# Patient Record
Sex: MALE | Race: WHITE
[De-identification: names, ages, dates, MRNs, and addresses within clinical notes are randomized per-mention and may not be internally consistent; named-entity substitution may affect disease eponyms.]

---

## 2017-07-18 ENCOUNTER — HOSPITAL ENCOUNTER (EMERGENCY)
Dept: HOSPITAL 21 - SED | Age: 52
Discharge: HOME | End: 2017-07-18
Payer: COMMERCIAL

## 2017-07-18 VITALS
SYSTOLIC BLOOD PRESSURE: 145 MMHG | DIASTOLIC BLOOD PRESSURE: 91 MMHG | HEART RATE: 99 BPM | OXYGEN SATURATION: 100 % | RESPIRATION RATE: 14 BRPM

## 2017-07-18 VITALS
SYSTOLIC BLOOD PRESSURE: 173 MMHG | DIASTOLIC BLOOD PRESSURE: 72 MMHG | OXYGEN SATURATION: 100 % | RESPIRATION RATE: 14 BRPM | HEART RATE: 96 BPM

## 2017-07-18 VITALS — WEIGHT: 230.49 LBS | BODY MASS INDEX: 34.93 KG/M2 | HEIGHT: 68 IN

## 2017-07-18 VITALS — HEART RATE: 84 BPM | RESPIRATION RATE: 21 BRPM | OXYGEN SATURATION: 94 %

## 2017-07-18 VITALS — RESPIRATION RATE: 22 BRPM | OXYGEN SATURATION: 92 % | HEART RATE: 91 BPM

## 2017-07-18 DIAGNOSIS — Z88.5: ICD-10-CM

## 2017-07-18 DIAGNOSIS — E78.5: ICD-10-CM

## 2017-07-18 DIAGNOSIS — G89.29: ICD-10-CM

## 2017-07-18 DIAGNOSIS — Z79.84: ICD-10-CM

## 2017-07-18 DIAGNOSIS — R53.83: ICD-10-CM

## 2017-07-18 DIAGNOSIS — M54.5: ICD-10-CM

## 2017-07-18 DIAGNOSIS — E11.9: ICD-10-CM

## 2017-07-18 DIAGNOSIS — R45.1: Primary | ICD-10-CM

## 2017-07-18 DIAGNOSIS — I10: ICD-10-CM

## 2017-07-18 LAB
BASOPHILS % (AUTO): 0.8 % (ref 0–3)
EOSINOPHILS % (AUTO): 1.8 % (ref 0–5)
MAGNESIUM: 2 MG/DL (ref 1.6–2.6)
MCH RBC QN AUTO: 30.5 PG (ref 27–35)
MEAN CORPUSCULAR VOLUME: 86.7 FL (ref 81–100)
MONOCYTES % (AUTO): 9.9 % (ref 4–12)
NEUTROPHILS NFR BLD AUTO: 65.8 % (ref 40–74)
PLATELET COUNT: 317 BIL/L (ref 150–400)
TROPONIN T SERPL-MCNC: 0.01 UG/L (ref 0–0.01)

## 2017-07-18 NOTE — ED.REPORT
HPI-General Illness


Date of Service


Jul 18, 2017


 ED Provider: Eris Lao MD


Patient is a 52 year old male with a hx of HTN, hyperlipidemia, and DM who 

presents to the ED via EMS s/p being found sleeping in his car at a gas station 

and per medics, acting confused. Upon interview, he has no medical complaints. 

He states "I am not confused. I am of a sound mind." He also reports that he 

does not want the officer that was on scene to enter his room because "he took 

something from my vehicle illegally without probable cause." He reports feeling 

fatigued and agitated in response to the situation. When asked if he would like 

medical care he states "I am not refusing care." He denies chest pain, SOB, 

headache, vomiting, fevers, dysuria, or any other symptoms. 


He takes metformin, Lipitor, Buspirone, Dicyclomine, Glipizide, Lisinopril, 

Metoprolol, omeprazole, Effexor and Lipoxide daily. He has not been on 

Methadone for 6 mo. He was on Methadone for his back pain.


[facility] 


He denies drug use or homelessness.  He denies drug testing.


Nursing Notes


Stated Complaint:  CONFUSION


Chief Complaint:  General Complaint


Nursing Notes Reviewed:  Yes


Allergies:  


Coded Allergies:  


     tramadol (Verified  Allergy, Intermediate, ANGRY, 4/14/16)


Scheduled


Atorvastatin (Lipitor) 80 Mg Tablet 80 MG PO DAILY 


Buspirone (Buspirone) 15 Mg Tablet 15 MG PO TID 


Cholecalciferol (Vitamin D3) (Vitamin D) 1,000 Unit Tablet 2,000 UNIT PO DAILY 


Gabapentin (Gabapentin) 300 Mg Capsule 300 MG PO TID 


Glipizide (Glipizide) 5 Mg Tablet 5 MG PO BID 


Lisinopril (Lisinopril) 20 Mg Tablet 20 MG PO DAILY 


Metformin (Metformin) 500 Mg Tablet 2,000 MG PO QPM 


Methadone (Methadone) 5 Mg Tablet 2.5 MG PO BID 


Metoprolol Tartrate (Metoprolol Tartrate) 25 Mg Tablet 25 MG PO BID 


Omeprazole (Omeprazole) 40 Mg Capsule.dr 40 MG PO BID 


Venlafaxine ER (Venlafaxine ER) 150 Mg Tab.er.24 300 MG PO DAILY 





Scheduled PRN


Dicyclomine (Dicyclomine) 20 Mg Tablet 20 MG PO QID PRN PRN For GI Cramps 





General


Time Seen by MD:  08:27





Chief Complaint


Other (Confusion )


Hx Obtained From:  Patient


Arrived By:  Ambulance


Onset Occurred:  Just prior to arrival


Severity: Current:  No pain currently


Severity: Maximum:  No pain





Past Medical History


Past Medical History





Carpal Tunnel 


HTN


Hyperlipidemia


DJD


DM


Chronic neck and back pain 


Restless leg syndrome





Past Surgical History





Carpal Tunnel surg 


Knee scope x2





Smoking History


Smoker Current Status UNK





Social History


Drug Use:  Denies drug use





Ambulatory Status


Independent





Review of Systems


Full Review of Systems


Constitutional:  Reports: Fatigue, 


   Denies: Fever


Respiratory:  Denies: Shortness of breath


Cardiovascular:  Denies: Chest pain


GI:  Denies: Vomiting


 Male:  Denies Dysuria


Musculoskeletal:  Reports: Back pain


Neurologic:  Denies: Confusion, Headache


Psychiatric:  Reports: Agitation


Complete sys rev & neg:  except as marked.





Physical Exam


Vital Signs





 Vital Signs








  Date Time  Temp Pulse Resp B/P Pulse Ox O2 Delivery O2 Flow Rate FiO2


 


7/18/17 13:42  99 14 145/91 100 Room Air  


 


7/18/17 11:01  84 21  94 Room Air  


 


7/18/17 10:21  91 22  92 Room Air  


 


7/18/17 08:21 36.8 96 14 173/72 100 Room Air  








Initial VS:  Reviewed, Vital signs abnormal


    Head / Eyes:  Atraumatic, Normocephalic


    Neck:  Full range of motion


    Abdomen / GI:  Soft, Non-tender


General/Constitutional:  Awake, Alert


Behavior:  Positive: Restless


Respiratory / Chest:  Breath sounds NL, Breath sounds = bilat, No respiratory 

distress


Cardiovascular:  Heart rate NL, Regular rhythm, Heart sounds NL, No gallop, No 

murmurs, No rubs


Back:  Full range of motion


Skin:  Color NL, Warm, Dry


Neurologic:  Oriented X3


Psychiatric:  Not suicidal, Not homicidal, No hallucinations, Cognitive 

function NL, Thought content NL


Abnormal Mood/Affect:  Positive: Pressured speech





multiple references to rights and legal concerns





Interpretation & Diagnostics


Lab Results Interpretation


Result Diagram:  


7/18/17 0900                                                                   

             7/18/17 0900














Test


  7/18/17


09:00 7/18/17


09:15


 


White Blood Count


  7.1th/mm3


(3.8-10.1) 


 


 


Red Blood Count


  4.52mil/mm3


(4.40-5.80) 


 


 


Hemoglobin


  13.8g/dL


(13.8-17.2) 


 


 


Hematocrit


  39.2%


(41.0-50.0) 


 


 


Mean Corpuscular Volume


  86.7fL


() 


 


 


Mean Corpuscular Hemoglobin


  30.5pg


(27.0-35.0) 


 


 


Mean Corpuscular Hemoglobin


Concent 35.2%


(32.0-37.0) 


 


 


Red Cell Distribution Width


  13.4%


(12.3-15.4) 


 


 


Platelet Count


  317bil/L


(150-400) 


 


 


Neutrophils (%) (Auto) 65.8% (40-74)  


 


Lymphocytes (%) (Auto) 21.6% (14-46)  


 


Monocytes (%) (Auto) 9.9% (4-12)  


 


Eosinophils (%) (Auto) 1.8% (0-5)  


 


Basophils (%) (Auto) 0.8% (0-3)  


 


Sodium Level


  139mEq/L


(134-144) 


 


 


Potassium Level


  3.7mEq/L


(3.5-5.2) 


 


 


Chloride Level


  101mEq/L


() 


 


 


Carbon Dioxide Level


  22mmol/L


(18-29) 


 


 


Blood Urea Nitrogen 20mg/dL (6-24)  


 


Creatinine


  1.03mg/dL


(0.76-1.27) 


 


 


Estimat Glomerular Filtration


Rate 81mL/min (>59) 


  


 


 


Glucose Level


  197mg/dL


(60-99) 


 


 


Calcium Level


  9.5mg/dL


(8.5-10.1) 


 


 


Magnesium Level


  2.0mg/dL


(1.6-2.6) 


 


 


Total Bilirubin


  1.4mg/dL


(0.0-1.2) 


 


 


Aspartate Amino Transf


(AST/SGOT) 13U/L (0-50) 


  


 


 


Alanine Aminotransferase


(ALT/SGPT) 13U/L (0-44) 


  


 


 


Alkaline Phosphatase 65U/L ()  


 


Troponin T


  0.010ug/L


(0.0-0.011) 


 


 


Total Protein


  7.3g/dL


(6.4-8.4) 


 


 


Albumin


  4.1g/dL


(3.4-5.0) 


 


 


Hold Grey Top Tube


  


  Received


(Received)











ECG Interpretation





   ECG Interpretation:  


Sinus rate 92 


No abnormalities


   Time:  10:02


   Interpreted by:  ED physician





Re-Eval/Medical Decision


Med Decision/Clinical Course





52-year-old male brought in secondary to agitation.  Had been sleeping in


his car and became agitated when he encountered law enforcement.  Was


agitated here and somewhat focused on legal ischemic concerns and


specifically refused to provide a urine drug screen.  I did ask him


specifically about whether or not he was on any illicit drug and he denied


but once again refused to provide a urine specimen.  Had some chronic back


pain which she was given some gabapentin 4 and he slept in the department


for a few hours.  Did not endorse homicidal or suicidal ideation did not


appear to have hallucinations exam and labs are otherwise reassuring.





   Time of Eval:  10:30


   Patient Status:  Condition improved


   Re-Evaluation/Progress Note:  


Rechecked pt who was sleeping soundly. His back pain is much better now.


He arouses to stimuli but falls back asleep.








   Time of Eval:  13:36


   Re-Evaluation/Progress Note:  


Rechecked pt. He arouses to stimuli and is able to stay awake. Discussed


plan for discharge. Patient understands and agrees with plan. All


questions addressed at this time.


Counseled Regarding:  Diagnosis, Lab results, Need for follow-up, When/why to 

return to ED





Discharge & Departure





 Primary Impression:  


 Agitation


 Additional Impression:  


 Chronic back pain


 Back pain location:  low back pain  Back pain laterality:  unspecified  

Sciatica presence:  unspecified whether sciatica present  Qualified Code:  

M54.5 - Low back pain


Disposition:  Home


Discharge Condition


All VS Reviewed:  Yes


Condition:  Stable





Additional Instructions:


Emergency department evaluation today included interview, examination labs and 

ECG.  No acute medical problem is identified today.  After observation in the 

emergency department, we feel it is safe fir you to be discharged.  May 

gabapentin 300 mg 3 times a day as a trial of pain medication for your back 

pain.  [facility] and discuss possible sleep apnea with them 

as we noted that you were snoring vigorously.  Continue other previous home 

medications.  Return emergency Department for any new or worsening symptoms.


[facility]  


[facility]


Scribe Attestation


Portions of this note were transcribed by Braydon Lopez. I, Dr. Lao personally 

performed the history, physical exam and medical decision-making; I reviewed 

and confirmed the accuracy of the information in the transcribed note.





Signed by: Braydon Lopez 7/18/2017, 1337


copies to:   [facility]








Eris Lao MD Jul 18, 2017 08:28


BRAYDON LOPEZ Jul 18, 2017 08:42

## 2019-04-30 ENCOUNTER — HOSPITAL ENCOUNTER (OUTPATIENT)
Dept: HOSPITAL 76 - EMS | Age: 54
Discharge: ANOTHER HOSPITAL | End: 2019-04-30
Attending: SURGERY
Payer: MEDICAID

## 2019-04-30 ENCOUNTER — HOSPITAL ENCOUNTER (EMERGENCY)
Dept: HOSPITAL 76 - ED | Age: 54
Discharge: HOME | End: 2019-04-30
Payer: MEDICAID

## 2019-04-30 VITALS — SYSTOLIC BLOOD PRESSURE: 103 MMHG | DIASTOLIC BLOOD PRESSURE: 70 MMHG

## 2019-04-30 DIAGNOSIS — R46.89: Primary | ICD-10-CM

## 2019-04-30 DIAGNOSIS — F15.921: Primary | ICD-10-CM

## 2019-04-30 DIAGNOSIS — R44.3: ICD-10-CM

## 2019-04-30 DIAGNOSIS — F15.988: ICD-10-CM

## 2019-04-30 DIAGNOSIS — R45.1: ICD-10-CM

## 2019-04-30 DIAGNOSIS — F32.9: ICD-10-CM

## 2019-04-30 LAB
ALBUMIN DIAFP-MCNC: 3.8 G/DL (ref 3.2–5.5)
ALBUMIN/GLOB SERPL: 0.9 {RATIO} (ref 1–2.2)
ALP SERPL-CCNC: 78 IU/L (ref 42–121)
ALT SERPL W P-5'-P-CCNC: 24 IU/L (ref 10–60)
AMPHET UR QL SCN: POSITIVE
ANION GAP SERPL CALCULATED.4IONS-SCNC: 14 MMOL/L (ref 6–13)
APAP SERPL-MCNC: < 10 UG/ML (ref 10–30)
AST SERPL W P-5'-P-CCNC: 23 IU/L (ref 10–42)
BASOPHILS NFR BLD AUTO: 0.1 10^3/UL (ref 0–0.1)
BASOPHILS NFR BLD AUTO: 0.7 %
BENZODIAZ UR QL SCN: NEGATIVE
BILIRUB BLD-MCNC: 2.5 MG/DL (ref 0.2–1)
BUN SERPL-MCNC: 17 MG/DL (ref 6–20)
CALCIUM UR-MCNC: 9 MG/DL (ref 8.5–10.3)
CHLORIDE SERPL-SCNC: 95 MMOL/L (ref 101–111)
CLARITY UR REFRACT.AUTO: CLEAR
CO2 SERPL-SCNC: 22 MMOL/L (ref 21–32)
COCAINE UR-SCNC: POSITIVE UMOL/L
CREAT SERPLBLD-SCNC: 1.1 MG/DL (ref 0.6–1.2)
EOSINOPHIL # BLD AUTO: 0.1 10^3/UL (ref 0–0.7)
EOSINOPHIL NFR BLD AUTO: 0.8 %
ERYTHROCYTE [DISTWIDTH] IN BLOOD BY AUTOMATED COUNT: 12.5 % (ref 12–15)
GFRSERPLBLD MDRD-ARVRAT: 70 ML/MIN/{1.73_M2} (ref 89–?)
GLOBULIN SER-MCNC: 4.1 G/DL (ref 2.1–4.2)
GLUCOSE SERPL-MCNC: 271 MG/DL (ref 70–100)
GLUCOSE UR QL STRIP.AUTO: >=1000 MG/DL
HGB UR QL STRIP: 13.7 G/DL (ref 14–18)
KETONES UR QL STRIP.AUTO: (no result) MG/DL
LIPASE SERPL-CCNC: 29 U/L (ref 22–51)
LYMPHOCYTES # SPEC AUTO: 1.1 10^3/UL (ref 1.5–3.5)
LYMPHOCYTES NFR BLD AUTO: 12.6 %
MCH RBC QN AUTO: 31.6 PG (ref 27–31)
MCHC RBC AUTO-ENTMCNC: 35.8 G/DL (ref 32–36)
MCV RBC AUTO: 88.4 FL (ref 80–94)
METHADONE UR QL SCN: NEGATIVE
METHAMPHET UR QL SCN: POSITIVE
MONOCYTES # BLD AUTO: 0.7 10^3/UL (ref 0–1)
MONOCYTES NFR BLD AUTO: 8.2 %
NEUTROPHILS # BLD AUTO: 7 10^3/UL (ref 1.5–6.6)
NEUTROPHILS # SNV AUTO: 9.1 X10^3/UL (ref 4.8–10.8)
NEUTROPHILS NFR BLD AUTO: 77.7 %
NITRITE UR QL STRIP.AUTO: NEGATIVE
OPIATES UR QL SCN: NEGATIVE
PDW BLD AUTO: 7 FL (ref 7.4–11.4)
PH UR STRIP.AUTO: 5.5 PH (ref 5–7.5)
PLATELET # BLD: 317 10^3/UL (ref 130–450)
PROT SPEC-MCNC: 7.9 G/DL (ref 6.7–8.2)
PROT UR STRIP.AUTO-MCNC: (no result) MG/DL
RBC # UR STRIP.AUTO: NEGATIVE /UL
RBC MAR: 4.32 10^6/UL (ref 4.7–6.1)
SALICYLATES SERPL-MCNC: < 6 MG/DL
SODIUM SERPLBLD-SCNC: 131 MMOL/L (ref 135–145)
SP GR UR STRIP.AUTO: 1.02 (ref 1–1.03)
UROBILINOGEN UR QL STRIP.AUTO: (no result) E.U./DL
UROBILINOGEN UR STRIP.AUTO-MCNC: NEGATIVE MG/DL
VOLATILE DRUGS POS SERPL SCN: (no result)

## 2019-04-30 PROCEDURE — 80306 DRUG TEST PRSMV INSTRMNT: CPT

## 2019-04-30 PROCEDURE — 99283 EMERGENCY DEPT VISIT LOW MDM: CPT

## 2019-04-30 PROCEDURE — 36415 COLL VENOUS BLD VENIPUNCTURE: CPT

## 2019-04-30 PROCEDURE — 99284 EMERGENCY DEPT VISIT MOD MDM: CPT

## 2019-04-30 PROCEDURE — 87086 URINE CULTURE/COLONY COUNT: CPT

## 2019-04-30 PROCEDURE — 81003 URINALYSIS AUTO W/O SCOPE: CPT

## 2019-04-30 PROCEDURE — 80320 DRUG SCREEN QUANTALCOHOLS: CPT

## 2019-04-30 PROCEDURE — 85025 COMPLETE CBC W/AUTO DIFF WBC: CPT

## 2019-04-30 PROCEDURE — 80053 COMPREHEN METABOLIC PANEL: CPT

## 2019-04-30 PROCEDURE — 83690 ASSAY OF LIPASE: CPT

## 2019-04-30 PROCEDURE — 80307 DRUG TEST PRSMV CHEM ANLYZR: CPT

## 2019-04-30 PROCEDURE — 84443 ASSAY THYROID STIM HORMONE: CPT

## 2019-04-30 PROCEDURE — 96372 THER/PROPH/DIAG INJ SC/IM: CPT

## 2019-04-30 PROCEDURE — 81001 URINALYSIS AUTO W/SCOPE: CPT

## 2019-04-30 PROCEDURE — 80329 ANALGESICS NON-OPIOID 1 OR 2: CPT

## 2019-04-30 NOTE — ED PHYSICIAN DOCUMENTATION
ED Addendum





- Addendum


Addendum: 





04/30/19 15:52


54-year-old gentleman in the emergency department tonight after odd behavior 

after using methamphetamines.  Signed out from Dr. Chiang, he had received 

some sedatives for agitated delirium.  He was allowed to sleep for several hours

and I checked on him several times.  He slowly woke up at around 3:30 PM he was 

awake and alert walking around the department in no distress.  He was advised 

not to use methamphetamines again.  He was discharged home in stable condition.

## 2019-04-30 NOTE — ED PHYSICIAN DOCUMENTATION
History of Present Illness





- Stated complaint


Stated Complaint: MHE





- History obtained from


History obtained from: Patient





- History of Present Illness


Timing: Prior to arrival





- Additonal information


Additional information: 





Patient is a 54-year-old male with history of mental health issues including 

depression and hallucinations for which she takes medications compliantly 

presenting with agitation following use of methamphetamine.Patient admits that 

he has struggled with polysubstance use in the past.  Patient adamantly denies 

IV drug use and reports that he only used methamphetamine tonight.  He denies 

other coingestions, prescription medications, other recreational drugs including

marijuana or heroin, as well as alcohol.  Patient denies any particular 

complaints except for feeling jittery and paranoid. No other improving or 

worsening symptoms noted. EMS was contacted by police who were initially called 

as patient was acting abnormally in a parking lot.





Review of Systems


Cardiac: denies: Chest pain / pressure


Respiratory: denies: Dyspnea


GI: denies: Abdominal Pain, Vomiting


Psychiatric: reports: Depressed, Hallucinations





PD PAST MEDICAL HISTORY





- Past Medical History


Past Medical History: Yes


GI: GERD


Psych: Depression





- Past Surgical History


Past Surgical History: Yes


General: Cholecystectomy, Appendectomy





- Present Medications


Home Medications: 


                                Ambulatory Orders











 Medication  Instructions  Recorded  Confirmed


 


Olanzapine [Zyprexa] 10 mg PO BID 04/30/19 04/30/19


 


Sertraline HCl [Zoloft] 100 mg PO DAILY 04/30/19 04/30/19


 


raNITIdine [Zantac] 50 mg PO BID 04/30/19 04/30/19














- Allergies


Allergies/Adverse Reactions: 


                                    Allergies











Allergy/AdvReac Type Severity Reaction Status Date / Time


 


tramadol AdvReac  Unknown Verified 04/30/19 06:05














PD ED PE NORMAL





- General


General: Alert and oriented X 3, Well developed/nourished, Other (Jaw thrusting,

 jittery in bed, moving hands excessively)





- HEENT


HEENT: Atraumatic, PERRL (Slightly dialted but reactive pupils b/l).  No: 

Dentition benign (Poor dental hygiene throughout)





- Cardiac


Cardiac: No murmur.  No: RRR (Tachycardic)





- Respiratory


Respiratory: No respiratory distress, Clear bilaterally





- Abdomen


Abdomen: Normal bowel sounds, Soft, Non tender, Non distended





- Derm


Derm: Normal color, Warm and dry, No rash





- Extremities


Extremities: No deformity, No tenderness to palpate, No edema





- Neuro


Neuro: Alert and oriented X 3, No motor deficit, No sensory deficit





- Psych


Psych: Other (Polite, makes eye contact, restleness in bed)





Results





- Vitals


Vitals: 


                               Vital Signs - 24 hr











  04/30/19 04/30/19





  06:06 06:12


 


Temperature 37.3 C 


 


Heart Rate 118 H 118 H


 


Respiratory 20 





Rate  


 


Blood Pressure 124/92 H 


 


O2 Saturation 97 








                                     Oxygen











O2 Source                      Room air

















- Labs


Labs: 


                                Laboratory Tests











  04/30/19





  07:00


 


WBC  9.1


 


RBC  4.32 L


 


Hgb  13.7 L


 


Hct  38.2 L


 


MCV  88.4


 


MCH  31.6 H


 


MCHC  35.8


 


RDW  12.5


 


Plt Count  317


 


MPV  7.0 L


 


Neut # (Auto)  7.0 H


 


Lymph # (Auto)  1.1 L


 


Mono # (Auto)  0.7


 


Eos # (Auto)  0.1


 


Baso # (Auto)  0.1


 


Absolute Nucleated RBC  0.00


 


Nucleated RBC %  0.0














PD MEDICAL DECISION MAKING





- ED course


Complexity details: reviewed results, re-evaluated patient, considered 

differential, d/w patient


ED course: 





Patient admits to using methamphetamine just prior to arrival.  Patient believes

 that he used to much methamphetamine, which is causing his symptoms.  Patient 

is unable to remember how he used methamphetamine or how much.  Patient also 

denies other coingestions including prescription drugs, recreational drugs, or 

other illicit drugs, and alcohol.Has minimal complaints except for feeling 

jittery and high. Physical exam is relatively benign.  Do not find evidence of 

trauma, systemic illness or infection, neurological deficit.  Patient does have 

symptoms indicative of use of methamphetamine.  No other toxidromes, overdoses, 

withdrawal symptoms noted.  At this time, feel the patient is stable.  Will 

obtain screening lab work and urinalysis and monitor patient further.Patient 

then refused lab work and continued to be increasingly restless and agitated.  

Discussed possible medications with patient, who is not willing to take 

medications orally.  Felt most appropriate to provide IM injections of Haldol, 

Benadryl, and Ativan to relieve agitation symptoms and obtain work-up.Patient 

much more comfortable following medications unable to obtain urine and lab work 

samples.  Patient signed out to Dr. Rowley at approximately 0730.  Disposition 

pending work-up and reevaluation.

## 2019-05-25 NOTE — ED PHYSICIAN DOCUMENTATION
ED Addendum





- Addendum


Addendum: 





05/25/19 12:35


Clinical impression: Methamphetamine use